# Patient Record
Sex: FEMALE
[De-identification: names, ages, dates, MRNs, and addresses within clinical notes are randomized per-mention and may not be internally consistent; named-entity substitution may affect disease eponyms.]

---

## 2017-09-11 ENCOUNTER — HOSPITAL ENCOUNTER (EMERGENCY)
Dept: HOSPITAL 14 - H.ER | Age: 37
Discharge: HOME | End: 2017-09-11
Payer: COMMERCIAL

## 2017-09-11 VITALS — SYSTOLIC BLOOD PRESSURE: 138 MMHG | TEMPERATURE: 98.2 F | DIASTOLIC BLOOD PRESSURE: 84 MMHG | HEART RATE: 88 BPM

## 2017-09-11 VITALS — OXYGEN SATURATION: 98 % | RESPIRATION RATE: 17 BRPM

## 2017-09-11 VITALS — BODY MASS INDEX: 31.4 KG/M2

## 2017-09-11 DIAGNOSIS — K21.9: ICD-10-CM

## 2017-09-11 DIAGNOSIS — Z99.3: ICD-10-CM

## 2017-09-11 DIAGNOSIS — N39.0: Primary | ICD-10-CM

## 2017-09-11 NOTE — ED PDOC
HPI: Female  Pain


Time Seen by Provider: 09/11/17 21:09


Chief Complaint (Nursing): Female Genitourinary


Chief Complaint (Provider): Dysuria


History Per: Patient


History/Exam Limitations: no limitations


Onset/Duration Of Symptoms: Days (x 2-3)


Current Symptoms Are (Timing): Still Present


Additional Complaint(s): 





Yenifer Spence is a 36 y/o female who presents to the ED complaining of 

painful urination and urinary frequency for the past 2-3 days. Symptoms are 

described as burning during urination. Denies abdominal pain, back pain, fevers 

and chills. Patient has a history of UTIs but not frequently. 





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 

bound. 





PMD: Dr. Brianne Leiva





Past Medical History


Reviewed: Historical Data, Nursing Documentation, Vital Signs


Vital Signs: 





 Last Vital Signs











Temp  98.3 F   09/11/17 21:05


 


Pulse  93 H  09/11/17 21:05


 


Resp  17   09/11/17 21:05


 


BP  157/93 H  09/11/17 21:05


 


Pulse Ox  98   09/11/17 21:05














- Medical History


PMH: Arthritis, Asthma, Gastritis, GERD


   Denies: Diabetes, HTN, Chronic Kidney Disease


Other PMH: arthrogryposis, wheelchair dependent





- Surgical History


Surgical History: Endoscopy


Other surgeries: Multiple reconstructive surgeries





- Family History


Family History: States: Unknown Family Hx





- Home Medications


Home Medications: 


 Ambulatory Orders











 Medication  Instructions  Recorded


 


Omeprazole [Prilosec] 20 mg PO BID 10/04/15


 


Ibuprofen [Motrin] 600 mg PO Q6 PRN #20 tab 12/16/15


 


Albuterol HFA [Ventolin HFA 90 1 puff IH PRN PRN 12/16/16





mcg/actuation (8 g)]  


 


Fluticasone/Salmeterol 500/50 500 mg IH BID 12/16/16





[Advair Diskus 500/50]  


 


Nitrofurantoin Macrocrystals 100 mg PO BID #14 cap 09/11/17





[Macrobid]  














- Allergies


Allergies/Adverse Reactions: 


 Allergies











Allergy/AdvReac Type Severity Reaction Status Date / Time


 


No Known Allergies Allergy   Verified 09/11/17 21:08














Review of Systems


ROS Statement: Except As Marked, All Systems Reviewed And Found Negative


Constitutional: Negative for: Fever, Chills


Gastrointestinal: Negative for: Abdominal Pain


Genitourinary Female: Positive for: Dysuria, Frequency


Musculoskeletal: Negative for: Back Pain





Physical Exam





- Reviewed


Nursing Documentation Reviewed: Yes


Vital Signs Reviewed: Yes





- Physical Exam


Appears: Positive for: Well (seated in wheelchair during exam), Non-toxic, No 

Acute Distress


Head Exam: Positive for: ATRAUMATIC, NORMAL INSPECTION, NORMOCEPHALIC


Skin: Positive for: Normal Color, Warm, Dry


Eye Exam: Positive for: EOMI, Normal appearance, PERRL


Neck: Positive for: Normal, Painless ROM, Supple


Respiratory: Negative for: Respiratory Distress


Gastrointestinal/Abdominal: Positive for: Normal Exam, Soft.  Negative for: 

Tenderness


Extremity: Positive for: Other (Limited rom in legs).  Negative for: Normal ROM


Neurologic/Psych: Positive for: Alert, Oriented





- ECG


O2 Sat by Pulse Oximetry: 98 (RA)


Pulse Ox Interpretation: Normal





Medical Decision Making


Medical Decision Making: 





Initial Impression: UTI





Time: 21:20


Initial Plan:


--Pregnancy test


--ED Urine dipstick  





Time: 22:04


Clinical Impression: UTI





Upon provider evaluation patient is medically stable, and requires no further 

treatment in the ED at this time. Patient will be discharged with Rx for 

Macrobid. Counseling was provided and all questions were answered regarding 

diagnosis and need for follow up with PMD. There is agreement to discharge 

plan. Return if symptoms persist or worsen.





--------------------------------------------------------------------------------

-----------------


Scribe Attestation:


Documented by Julieta Mcallister, acting as a scribe for Sienna Álvarez MD





Provider Scribe Attestation:


All medical record entries made by the Scribe were at my direction and 

personally dictated by me. I have reviewed the chart and agree that the record 

accurately reflects my personal performance of the history, physical exam, 

medical decision making, and the department course for this patient. I have 

also personally directed, reviewed, and agree with the discharge instructions 

and disposition.





Disposition





- Clinical Impression


Clinical Impression: 


 UTI (urinary tract infection)








- Patient ED Disposition


Is Patient to be Admitted: No


Counseled Patient/Family Regarding: Studies Performed, Diagnosis, Need For 

Followup, Rx Given





- Disposition


Referrals: 


Brianne Leiva APN [Advanced Practice Nurse] - 


Disposition: Routine/Home


Disposition Time: 22:04


Condition: GOOD


Additional Instructions: 


Please follow up with PMD in 1-2 days. Take medication as instructed. Return if 

symptoms persist or worsen. 


Prescriptions: 


Nitrofurantoin Macrocrystals [Macrobid] 100 mg PO BID #14 cap


Instructions:  Urinary Tract Infection in Women (DC)

## 2018-01-18 ENCOUNTER — HOSPITAL ENCOUNTER (EMERGENCY)
Dept: HOSPITAL 14 - H.ER | Age: 38
Discharge: HOME | End: 2018-01-18
Payer: COMMERCIAL

## 2018-01-18 VITALS — RESPIRATION RATE: 18 BRPM

## 2018-01-18 VITALS — BODY MASS INDEX: 29.8 KG/M2

## 2018-01-18 VITALS
DIASTOLIC BLOOD PRESSURE: 97 MMHG | HEART RATE: 98 BPM | OXYGEN SATURATION: 97 % | SYSTOLIC BLOOD PRESSURE: 142 MMHG | TEMPERATURE: 97 F

## 2018-01-18 DIAGNOSIS — K21.9: ICD-10-CM

## 2018-01-18 DIAGNOSIS — N39.0: Primary | ICD-10-CM

## 2018-01-18 DIAGNOSIS — J45.909: ICD-10-CM

## 2018-01-18 NOTE — ED PDOC
HPI: Female  Pain


Time Seen by Provider: 01/18/18 09:18


Chief Complaint (Nursing): Female Genitourinary


Chief Complaint (Provider): Female Genitourinary


History Per: Patient


History/Exam Limitations: no limitations


Onset/Duration Of Symptoms: Days (x 1)


Current Symptoms Are (Timing): Still Present


Additional Complaint(s): 


37 year old female with a history of multiple urinary tract infections presents 

to the ED complaining of dysuria since last night. Also complains of lower 

abdominal cramping and urinary urgency. Patient reports feeling very similar 

symptoms to her previous UTIs. Denies incontinence, hematuria, vaginal 

discharge and fever. 








PMD: Dr. Brianne Leiva MD





Past Medical History


Reviewed: Historical Data, Nursing Documentation, Vital Signs


Vital Signs: 





 Last Vital Signs











Temp  97 F L  01/18/18 08:34


 


Pulse  98 H  01/18/18 08:34


 


Resp  18   01/18/18 08:34


 


BP  142/97 H  01/18/18 08:34


 


Pulse Ox  97   01/18/18 08:34














- Medical History


PMH: Arthritis, Asthma, Gastritis, GERD


   Denies: Diabetes, HTN, Chronic Kidney Disease


Other PMH: Urinary tract infections (multiple)





- Surgical History


Surgical History: Endoscopy





- Family History


Family History: States: Unknown Family Hx





- Social History


Current smoker - smoking cessation education provided: No


Alcohol: None


Drugs: Denies





- Home Medications


Home Medications: 


 Ambulatory Orders











 Medication  Instructions  Recorded


 


Omeprazole [Prilosec] 20 mg PO BID 10/04/15


 


Ibuprofen [Motrin] 600 mg PO Q6 PRN #20 tab 12/16/15


 


Albuterol HFA [Ventolin HFA 90 1 puff IH PRN PRN 12/16/16





mcg/actuation (8 g)]  


 


Fluticasone/Salmeterol 500/50 500 mg IH BID 12/16/16





[Advair Diskus 500/50]  


 


Nitrofurantoin Macrocrystals 100 mg PO BID #14 cap 09/11/17





[Macrobid]  


 


Naproxen [Naprosyn] 500 mg PO Q12 PRN #20 tablet 11/07/17


 


Nitrofurantoin Macrocrystals 100 mg PO BID #13 cap 01/18/18





[Macrobid]  


 


Phenazopyridine [Pyridium] 200 mg PO TID PRN #6 tab 01/18/18














- Allergies


Allergies/Adverse Reactions: 


 Allergies











Allergy/AdvReac Type Severity Reaction Status Date / Time


 


No Known Allergies Allergy   Verified 01/18/18 08:34














Review of Systems


ROS Statement: Except As Marked, All Systems Reviewed And Found Negative


Constitutional: Negative for: Fever


Gastrointestinal: Positive for: Abdominal Pain (lower abdominal cramping)


Genitourinary Female: Positive for: Dysuria, Frequency.  Negative for: 

Incontinence, Hematuria, Vaginal Discharge





Physical Exam





- Reviewed


Nursing Documentation Reviewed: Yes


Vital Signs Reviewed: Yes





- Physical Exam


Appears: Positive for: Non-toxic, No Acute Distress


Head Exam: Positive for: ATRAUMATIC, NORMOCEPHALIC


Skin: Positive for: Normal Color, Warm, Dry


Eye Exam: Positive for: EOMI, Normal appearance, PERRL


Neck: Positive for: Normal, Painless ROM, Supple


Cardiovascular/Chest: Positive for: Regular Rate, Rhythm


Respiratory: Positive for: Normal Breath Sounds.  Negative for: Respiratory 

Distress


Gastrointestinal/Abdominal: Positive for: Normal Exam, Soft.  Negative for: 

Tenderness


Back: Positive for: Normal Inspection


Extremity: Positive for: Normal ROM.  Negative for: Deformity


Neurologic/Psych: Positive for: Alert, Oriented (x 3)





- ECG


O2 Sat by Pulse Oximetry: 97 (RA)


Pulse Ox Interpretation: Normal





Medical Decision Making


Medical Decision Making: 


Time: 09:23


Impression: 


Plan: 


--urine dipstick


--Urine pregnancy 


--Macrobid 100 mg PO 


--Urine cx








Urine dip was negative for nitrates. Discussed with patient that we are putting 

her on antibiotics for UTI with her history of them. If urine culture is 

negative, patient is encouraged to follow up with PMD to figure out another 

possible source of her dysuria. 











--------------------------------------------------------------------------------

-----------------





Scribe Attestation: 


Documented by Pina Cook, acting as a scribe for Stephanie Mari MD 





Provider Scribe Attestation:


All medical record entries made by the Scribe were at my direction and 

personally dictated by me. I have reviewed the chart and agree that the record 

accurately reflects my personal performance of the history, physical exam, 

medical decision making, and the department course for this patient. I have 

also personally directed, reviewed, and agree with the discharge instructions 

and disposition.








Disposition





- Clinical Impression


Clinical Impression: 


 UTI (urinary tract infection)








- Disposition


Disposition: Routine/Home


Disposition Time: 09:24


Condition: STABLE


Additional Instructions: 


FOLLOW-UP WITH PMD WITHIN 2 DAYS FOR REEVALUATION. 


Prescriptions: 


Nitrofurantoin Macrocrystals [Macrobid] 100 mg PO BID #13 cap


Phenazopyridine [Pyridium] 200 mg PO TID PRN #6 tab


 PRN Reason: Bladder Spasm


Instructions:  Urinary Tract Infection in Women (ED)


Forms:  [facility] (English)

## 2018-05-21 ENCOUNTER — HOSPITAL ENCOUNTER (OUTPATIENT)
Dept: HOSPITAL 31 - C.ENDO | Age: 38
Discharge: HOME | End: 2018-05-21
Attending: INTERNAL MEDICINE
Payer: COMMERCIAL

## 2018-05-21 VITALS
HEART RATE: 88 BPM | RESPIRATION RATE: 18 BRPM | OXYGEN SATURATION: 100 % | SYSTOLIC BLOOD PRESSURE: 140 MMHG | DIASTOLIC BLOOD PRESSURE: 85 MMHG

## 2018-05-21 VITALS — BODY MASS INDEX: 29.8 KG/M2

## 2018-05-21 VITALS — TEMPERATURE: 97 F

## 2018-05-21 DIAGNOSIS — K64.8: ICD-10-CM

## 2018-05-21 DIAGNOSIS — C20: Primary | ICD-10-CM

## 2018-05-21 LAB
ALBUMIN SERPL-MCNC: 4.2 G/DL (ref 3.5–5)
ALBUMIN/GLOB SERPL: 1.1 {RATIO} (ref 1–2.1)
ALT SERPL-CCNC: 26 U/L (ref 9–52)
AST SERPL-CCNC: 23 U/L (ref 14–36)
BASOPHILS # BLD AUTO: 0 K/UL (ref 0–0.2)
BASOPHILS NFR BLD: 0.2 % (ref 0–2)
BUN SERPL-MCNC: 6 MG/DL (ref 7–17)
CALCIUM SERPL-MCNC: 9.2 MG/DL (ref 8.6–10.4)
EOSINOPHIL # BLD AUTO: 0 K/UL (ref 0–0.7)
EOSINOPHIL NFR BLD: 0.5 % (ref 0–4)
ERYTHROCYTE [DISTWIDTH] IN BLOOD BY AUTOMATED COUNT: 12.8 % (ref 11.5–14.5)
GFR NON-AFRICAN AMERICAN: > 60
HGB BLD-MCNC: 14 G/DL (ref 11–16)
LYMPHOCYTES # BLD AUTO: 2.6 K/UL (ref 1–4.3)
LYMPHOCYTES NFR BLD AUTO: 28.5 % (ref 20–40)
MCH RBC QN AUTO: 30.7 PG (ref 27–31)
MCHC RBC AUTO-ENTMCNC: 35 G/DL (ref 33–37)
MCV RBC AUTO: 87.8 FL (ref 81–99)
MONOCYTES # BLD: 0.6 K/UL (ref 0–0.8)
MONOCYTES NFR BLD: 6.1 % (ref 0–10)
NEUTROPHILS # BLD: 5.9 K/UL (ref 1.8–7)
NEUTROPHILS NFR BLD AUTO: 64.7 % (ref 50–75)
NRBC BLD AUTO-RTO: 0.1 % (ref 0–2)
PLATELET # BLD: 318 K/UL (ref 130–400)
PMV BLD AUTO: 7.8 FL (ref 7.2–11.7)
RBC # BLD AUTO: 4.58 MIL/UL (ref 3.8–5.2)
WBC # BLD AUTO: 9.1 K/UL (ref 4.8–10.8)

## 2018-05-21 PROCEDURE — 80053 COMPREHEN METABOLIC PANEL: CPT

## 2018-05-21 PROCEDURE — 45380 COLONOSCOPY AND BIOPSY: CPT

## 2018-05-21 PROCEDURE — 84703 CHORIONIC GONADOTROPIN ASSAY: CPT

## 2018-05-21 PROCEDURE — 82378 CARCINOEMBRYONIC ANTIGEN: CPT

## 2018-05-21 PROCEDURE — 88305 TISSUE EXAM BY PATHOLOGIST: CPT

## 2018-05-21 PROCEDURE — 36415 COLL VENOUS BLD VENIPUNCTURE: CPT

## 2018-05-21 PROCEDURE — 85025 COMPLETE CBC W/AUTO DIFF WBC: CPT

## 2018-05-30 ENCOUNTER — HOSPITAL ENCOUNTER (OUTPATIENT)
Dept: HOSPITAL 31 - C.ENDO | Age: 38
Discharge: HOME | End: 2018-05-30
Attending: INTERNAL MEDICINE
Payer: COMMERCIAL

## 2018-05-30 VITALS — RESPIRATION RATE: 20 BRPM | HEART RATE: 103 BPM | SYSTOLIC BLOOD PRESSURE: 128 MMHG | DIASTOLIC BLOOD PRESSURE: 82 MMHG

## 2018-05-30 VITALS — BODY MASS INDEX: 29.8 KG/M2

## 2018-05-30 VITALS — TEMPERATURE: 98 F

## 2018-05-30 VITALS — OXYGEN SATURATION: 100 %

## 2018-05-30 DIAGNOSIS — C20: Primary | ICD-10-CM

## 2018-05-30 DIAGNOSIS — K21.0: ICD-10-CM

## 2018-05-30 DIAGNOSIS — K44.9: ICD-10-CM

## 2018-05-30 DIAGNOSIS — K59.09: ICD-10-CM

## 2018-05-30 PROCEDURE — 45335 SIGMOIDOSCOPY W/SUBMUC INJ: CPT

## 2018-05-30 PROCEDURE — 45342 SIGMOIDOSCOPY W/US GUIDE BX: CPT

## 2018-05-30 PROCEDURE — 88305 TISSUE EXAM BY PATHOLOGIST: CPT

## 2018-05-30 PROCEDURE — 84703 CHORIONIC GONADOTROPIN ASSAY: CPT

## 2018-05-30 PROCEDURE — 45331 SIGMOIDOSCOPY AND BIOPSY: CPT

## 2018-07-29 ENCOUNTER — HOSPITAL ENCOUNTER (EMERGENCY)
Dept: HOSPITAL 14 - H.ER | Age: 38
Discharge: HOME | End: 2018-07-29
Payer: COMMERCIAL

## 2018-07-29 VITALS — BODY MASS INDEX: 30.7 KG/M2

## 2018-07-29 VITALS — TEMPERATURE: 98.9 F | OXYGEN SATURATION: 98 % | RESPIRATION RATE: 16 BRPM

## 2018-07-29 VITALS — HEART RATE: 94 BPM | SYSTOLIC BLOOD PRESSURE: 128 MMHG | DIASTOLIC BLOOD PRESSURE: 80 MMHG

## 2018-07-29 DIAGNOSIS — C19: ICD-10-CM

## 2018-07-29 DIAGNOSIS — N39.0: Primary | ICD-10-CM

## 2018-07-29 LAB
BILIRUB UR-MCNC: NEGATIVE MG/DL
COLOR UR: YELLOW
GLUCOSE UR STRIP-MCNC: (no result) MG/DL
LEUKOCYTE ESTERASE UR-ACNC: (no result) LEU/UL
PH UR STRIP: 6 [PH] (ref 5–8)
PROT UR STRIP-MCNC: NEGATIVE MG/DL
RBC # UR STRIP: NEGATIVE /UL
SP GR UR STRIP: 1 (ref 1–1.03)
SQUAMOUS EPITHIAL: < 1 /HPF (ref 0–5)
URINE CLARITY: (no result)
UROBILINOGEN UR-MCNC: (no result) MG/DL (ref 0.2–1)

## 2018-07-29 NOTE — ED PDOC
HPI: Female  Pain


Time Seen by Provider: 07/29/18 10:14


Chief Complaint (Nursing): Female Genitourinary


History Per: Patient


Additional Complaint(s): 





Pt is a 38 yo female, currently going through treatment for colorectal CA, 

presents to ED for evaluation of possible UTI. Pt notes mild burning at the end 

or urinating.  Pt was told that her bladder can spasm due to treatments and 

that will be normal. Pt denies fever, chills, nausea, vomiting, abdominal or 

back pain. 





Past Medical History


Reviewed: Nursing Documentation, Vital Signs


Vital Signs: 





 Last Vital Signs











Temp  98.9 F   07/29/18 10:09


 


Pulse  110 H  07/29/18 10:09


 


Resp  16   07/29/18 10:09


 


BP  133/81   07/29/18 10:09


 


Pulse Ox  98   07/29/18 10:09














- Medical History


PMH: Arthritis, Asthma (TAKES NO MEDS, OR INHALERS), Gastritis, GERD


   Denies: Diabetes, Fractures, HTN, Chronic Kidney Disease





- Surgical History


Surgical History: Endoscopy





- Family History


Family History: States: Unknown Family Hx





- Living Arrangements


Living Arrangements: With Family





- Social History


Current smoker - smoking cessation education provided: No


Alcohol: None


Drugs: Denies





- Home Medications


Home Medications: 


 Ambulatory Orders











 Medication  Instructions  Recorded


 


Omeprazole Magnesium [Prilosec Otc] 1 tab PO QOD6 05/21/18


 


Multivitamin [Multivitamins] 1 tab PO DAILY 05/30/18


 


Ranitidine HCl 150 mg PO QOD6 05/30/18


 


Nitrofurantoin Macrocrystals 100 mg PO BID #10 cap 07/29/18





[Macrobid]  


 


Phenazopyridine HCl [Pyridium] 100 mg PO TID #6 tab 07/29/18














- Allergies


Allergies/Adverse Reactions: 


 Allergies











Allergy/AdvReac Type Severity Reaction Status Date / Time


 


No Known Allergies Allergy   Verified 07/29/18 10:18














Review of Systems


ROS Statement: Except As Marked, All Systems Reviewed And Found Negative


Genitourinary Female: Positive for: Dysuria





Physical Exam





- Reviewed


Nursing Documentation Reviewed: Yes


Vital Signs Reviewed: Yes





- Physical Exam


Appears: Positive for: Well, Non-toxic, No Acute Distress


Head Exam: Positive for: ATRAUMATIC, NORMAL INSPECTION, NORMOCEPHALIC


Skin: Positive for: Normal Color, Warm, DRY


Eye Exam: Positive for: EOMI, Normal appearance, PERRL


ENT: Positive for: Normal ENT Inspection


Neck: Positive for: Normal, Painless ROM


Cardiovascular/Chest: Positive for: Regular Rate, Rhythm


Respiratory: Positive for: CNT, Normal Breath Sounds


Gastrointestinal/Abdominal: Positive for: Normal Exam, Soft


Back: Positive for: Normal Inspection


Extremity: Positive for: Normal ROM


Neurologic/Psych: Positive for: Alert





- ECG


O2 Sat by Pulse Oximetry: 98





Medical Decision Making


Medical Decision Making: 





UA reviewed. Pt given RX for Macrobid, CX sent. 





advised to follow up with PCP as scheduled tomorrow





Disposition





- Clinical Impression


Clinical Impression: 


 UTI (urinary tract infection)








- Patient ED Disposition


Is Patient to be Admitted: No





- Disposition


Disposition: Routine/Home


Disposition Time: 12:11


Condition: STABLE


Prescriptions: 


Nitrofurantoin Macrocrystals [Macrobid] 100 mg PO BID #10 cap


Phenazopyridine HCl [Pyridium] 100 mg PO TID #6 tab


Instructions:  Urinary Tract Infections in Adults


Forms:  [facility] (English)

## 2018-10-31 ENCOUNTER — HOSPITAL ENCOUNTER (EMERGENCY)
Dept: HOSPITAL 14 - H.ER | Age: 38
LOS: 1 days | Discharge: HOME | End: 2018-11-01
Payer: COMMERCIAL

## 2018-10-31 VITALS — RESPIRATION RATE: 18 BRPM | SYSTOLIC BLOOD PRESSURE: 113 MMHG | DIASTOLIC BLOOD PRESSURE: 64 MMHG

## 2018-10-31 VITALS — BODY MASS INDEX: 30.7 KG/M2

## 2018-10-31 DIAGNOSIS — Z93.2: Primary | ICD-10-CM

## 2018-10-31 LAB
ALBUMIN SERPL-MCNC: 4.7 G/DL (ref 3.5–5)
ALBUMIN/GLOB SERPL: 1.1 {RATIO} (ref 1–2.1)
ALT SERPL-CCNC: 38 U/L (ref 9–52)
AST SERPL-CCNC: 25 U/L (ref 14–36)
BASE EXCESS BLDV CALC-SCNC: -3.2 MMOL/L (ref 0–2)
BASOPHILS # BLD AUTO: 0 K/UL (ref 0–0.2)
BASOPHILS NFR BLD: 0.4 % (ref 0–2)
BUN SERPL-MCNC: 14 MG/DL (ref 7–17)
CALCIUM SERPL-MCNC: 10 MG/DL (ref 8.4–10.2)
EOSINOPHIL # BLD AUTO: 0.1 K/UL (ref 0–0.7)
EOSINOPHIL NFR BLD: 1.5 % (ref 0–4)
ERYTHROCYTE [DISTWIDTH] IN BLOOD BY AUTOMATED COUNT: 12.3 % (ref 11.5–14.5)
GFR NON-AFRICAN AMERICAN: > 60
HGB BLD-MCNC: 14.5 G/DL (ref 12–16)
LYMPHOCYTES # BLD AUTO: 1.1 K/UL (ref 1–4.3)
LYMPHOCYTES NFR BLD AUTO: 15.6 % (ref 20–40)
MCH RBC QN AUTO: 31 PG (ref 27–31)
MCHC RBC AUTO-ENTMCNC: 34.6 G/DL (ref 33–37)
MCV RBC AUTO: 89.5 FL (ref 81–99)
MONOCYTES # BLD: 0.7 K/UL (ref 0–0.8)
MONOCYTES NFR BLD: 9.8 % (ref 0–10)
NEUTROPHILS # BLD: 5 K/UL (ref 1.8–7)
NEUTROPHILS NFR BLD AUTO: 72.7 % (ref 50–75)
NRBC BLD AUTO-RTO: 0.2 % (ref 0–0)
PCO2 BLDV: 43 MMHG (ref 40–60)
PH BLDV: 7.33 [PH] (ref 7.32–7.43)
PLATELET # BLD: 368 K/UL (ref 130–400)
PMV BLD AUTO: 7.6 FL (ref 7.2–11.7)
RBC # BLD AUTO: 4.69 MIL/UL (ref 3.8–5.2)
VENOUS BLOOD FIO2: 21 %
VENOUS BLOOD GAS PO2: 42 MM/HG (ref 30–55)
WBC # BLD AUTO: 6.9 K/UL (ref 4.8–10.8)

## 2018-10-31 PROCEDURE — 80053 COMPREHEN METABOLIC PANEL: CPT

## 2018-10-31 PROCEDURE — 96374 THER/PROPH/DIAG INJ IV PUSH: CPT

## 2018-10-31 PROCEDURE — 87040 BLOOD CULTURE FOR BACTERIA: CPT

## 2018-10-31 PROCEDURE — 85025 COMPLETE CBC W/AUTO DIFF WBC: CPT

## 2018-10-31 PROCEDURE — 82803 BLOOD GASES ANY COMBINATION: CPT

## 2018-10-31 PROCEDURE — 99284 EMERGENCY DEPT VISIT MOD MDM: CPT

## 2018-10-31 NOTE — CP.PCM.CON
History of Present Illness





- History of Present Illness


History of Present Illness: 


GENERAL SURGERY CONSULT NOTE FOR DR. ZHOU





39yo F with PMHx of arthrogriposis and rectal cancer s/p LAR w/ ileostomy 

presents to the ED with complains of ileostomy pain and leaking. She had an LAR 

with ileostomy for rectal cancer on October 5, 2018 by Dr. Kimble at [facility]. She was discharged on the 9th. Since then, she has had problems with 

the ileostomy leaking. She has seen Dr. Kimble in his office 3 times since the 

surgery. She saw Dr. Kimble today and the bridge was removed. Her ileostomy was 

changed by the wound care nurse. However, after being home only an hour, the bag

was already leaking. 


The patient and her family state that they are changing the bag every hour due 

to leakage. They have seen wound care and tried numerous things including stoma 

paste, skin prep, malox. The patient states that she is not eating much because 

it hurts too much when she had output into the ostomy. 


Patient denies abdominal pain, fever, chills, nausea or vomiting. She is having 

small mucus drainage from the rectum.





PMHx: arthrogriposis, arthritis, rectal cancer


 


Surgeries: LAR w/ ileostomy on Oct 5, 2018 by Dr. Gilson Kimble at [facility]. Plan is for chemo and then Dr. Kimble will reverse ileostomy after 

chemo.





Allergies: none











Review of Systems





- Review of Systems


All systems: reviewed and no additional remarkable complaints except (as per 

HPI)





Past Patient History





- Infectious Disease


Hx of Infectious Diseases: None





- Past Medical History & Family History


Past Medical History?: Yes





- Past Social History


Smoking Status: Never Smoked





- CARDIAC


Hx Hypertension: No





- PULMONARY


Hx Asthma: Yes (TAKES NO MEDS, OR INHALERS)





- NEUROLOGICAL


Hx Neurological Disorder: No





- HEENT


Hx HEENT Problems: No





- RENAL


Hx Chronic Kidney Disease: No





- ENDOCRINE/METABOLIC


Hx Endocrine Disorders: No





- HEMATOLOGICAL/ONCOLOGICAL


Hx Blood Transfusions: No





- INTEGUMENTARY


Hx Dermatological Problems: No





- MUSCULOSKELETAL/RHEUMATOLOGICAL


Hx Arthritis: Yes


Hx Fractures: No





- GASTROINTESTINAL


Hx Gastritis: Yes





- GENITOURINARY/GYNECOLOGICAL


Hx Genitourinary Disorders: Yes


Hx Urinary Tract Infection: Yes





- PSYCHIATRIC


Hx Psychophysiologic Disorder: No


Hx Substance Use: No





- SURGICAL HISTORY


Other/Comment: ileostomy 10/5/18





- ANESTHESIA


Hx Anesthesia: Yes


Hx Anesthesia Reactions: No


Hx Malignant Hyperthermia: No





Meds


Allergies/Adverse Reactions: 


                                    Allergies











Allergy/AdvReac Type Severity Reaction Status Date / Time


 


No Known Allergies Allergy   Verified 10/31/18 18:15














Physical Exam





- Constitutional


Appears: Non-toxic, No Acute Distress





- Head Exam


Head Exam: NORMAL INSPECTION





- Eye Exam


Eye Exam: EOMI, Normal appearance





- Respiratory Exam


Respiratory Exam: NORMAL BREATHING PATTERN.  absent: Respiratory Distress





- Cardiovascular Exam


Cardiovascular Exam: +S1, +S2





- GI/Abdominal Exam


GI & Abdominal Exam: Soft.  absent: Distended, Firm, Guarding, Rebound, Rigid, 

Tenderness


Additional comments: 


Excoriated skin around ileostomy with liquid stool output. Skin very tender. 

Stoma pink and patent





- Neurological Exam


Neurological exam: Alert, CN II-XII Intact, Oriented x3





- Psychiatric Exam


Psychiatric exam: Normal Affect, Normal Mood





- Skin


Skin Exam: Warm





Results





- Vital Signs


Recent Vital Signs: 


                                Last Vital Signs











Temp  99.1 F   10/31/18 18:15


 


Pulse  112 H  10/31/18 18:15


 


Resp  20   10/31/18 18:15


 


BP  113/77   10/31/18 18:15


 


Pulse Ox  98   10/31/18 22:41














- Labs


Result Diagrams: 


                                 10/31/18 20:15





                                 10/31/18 20:15


Labs: 


                         Laboratory Results - last 24 hr











  10/31/18 10/31/18 10/31/18





  20:15 20:15 21:26


 


WBC  6.9  


 


RBC  4.69  


 


Hgb  14.5  


 


Hct  41.9  


 


MCV  89.5  D  


 


MCH  31.0  


 


MCHC  34.6  


 


RDW  12.3  


 


Plt Count  368  


 


MPV  7.6  


 


Neut % (Auto)  72.7  


 


Lymph % (Auto)  15.6 L  


 


Mono % (Auto)  9.8  


 


Eos % (Auto)  1.5  


 


Baso % (Auto)  0.4  


 


Neut # (Auto)  5.0  


 


Lymph # (Auto)  1.1  


 


Mono # (Auto)  0.7  


 


Eos # (Auto)  0.1  


 


Baso # (Auto)  0.0  


 


pO2    42


 


VBG pH    7.33


 


VBG pCO2    43


 


VBG HCO3    21.8


 


VBG Total CO2    24.0


 


VBG O2 Sat (Calc)    78.2 H


 


VBG Base Excess    -3.2 L


 


VBG Potassium    3.4 L


 


Glucose    105


 


Lactate    1.1


 


FiO2    21.0


 


Sodium   141  138.0


 


Potassium   3.9 


 


Chloride   106  108.0 H


 


Carbon Dioxide   21 L 


 


Anion Gap   18 


 


BUN   14 


 


Creatinine   0.3 L 


 


Est GFR ( Amer)   > 60 


 


Est GFR (Non-Af Amer)   > 60 


 


Random Glucose   104 


 


Calcium   10.0 


 


Total Bilirubin   0.4 


 


AST   25 


 


ALT   38 


 


Alkaline Phosphatase   106 


 


Total Protein   8.9 H 


 


Albumin   4.7 


 


Globulin   4.2 H 


 


Albumin/Globulin Ratio   1.1 


 


Venous Blood Potassium    3.4 L














Assessment & Plan





- Assessment and Plan (Free Text)


Assessment: 


39yo F with PMHx of arthrogriposis, rectal cancer s/p LAR w/ ileostomy presents 

to the ED with complains of ileostomy pain and leaking.





- No leukocytosis, electrolytes WNL


- Clear for discharge home to follow up with pt's surgeon, Dr. Kimble and wound 

care nurse


- Recommend Maalox on ileostomy site


- Discussed plan with Dr. Cedrick Cool PGY-4

## 2018-10-31 NOTE — ED PDOC
HPI: Abdomen


Time Seen by Provider: 10/31/18 18:40


Chief Complaint (Nursing): GI Problem


Chief Complaint (Provider): ileostomy bag problems


History Per: Patient


History/Exam Limitations: no limitations


Onset/Duration Of Symptoms: Days


Additional Complaint(s): 





Yenifer Spence is a 38 year old female, with a past medical history of 

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

to the emergency department accompanied by her sister and daughter complaining 

of pain around ileostomy site and leaking around the ileostomy tube. Patient 

states she had surgery done at [facility] on Oct 5th. However, 

she's had problems with her ileostomy since then. Patient states the skin around

the site is very sore and painful. Her ileostomy bag has needed to be changed 

every day to every hour now as opposed to every 2-3 days as its supposed to be. 

Patient reports a decreased appetite for the last x3 days and believes she may 

be dehydrated but denies any fever, chills, or other medical complaints.





PMD: Daija Lopez


Surgeon: At [facility].





Past Medical History


Reviewed: Historical Data, Nursing Documentation, Vital Signs


Vital Signs: 





                                Last Vital Signs











Temp  99.1 F   10/31/18 18:15


 


Pulse  112 H  10/31/18 18:15


 


Resp  20   10/31/18 18:15


 


BP  113/77   10/31/18 18:15


 


Pulse Ox  98   10/31/18 18:15














- Medical History


PMH: Arthritis, Asthma (TAKES NO MEDS, OR INHALERS), Gastritis, GERD


   Denies: Diabetes, Fractures, HTN, Chronic Kidney Disease


Other PMH: rectal CA, congenital muscular disorder





- Surgical History


Surgical History: Endoscopy


Other surgeries: amputated digits





- Family History


Family History: States: Unknown Family Hx





- Social History


Current smoker - smoking cessation education provided: No


Alcohol: None


Drugs: Denies





- Home Medications


Home Medications: 


                                Ambulatory Orders











 Medication  Instructions  Recorded


 


Omeprazole Magnesium [Prilosec Otc] 1 tab PO QOD6 05/21/18


 


RX: Multivitamin [Multivitamins] 1 tab PO DAILY 05/30/18


 


RX: Ranitidine HCl 150 mg PO QOD6 05/30/18


 


Nitrofurantoin Macrocrystals 100 mg PO BID #10 cap 07/29/18





[Macrobid]  


 


Phenazopyridine HCl [Pyridium] 100 mg PO TID #6 tab 07/29/18














- Allergies


Allergies/Adverse Reactions: 


                                    Allergies











Allergy/AdvReac Type Severity Reaction Status Date / Time


 


No Known Allergies Allergy   Verified 10/31/18 18:15














Review of Systems


ROS Statement: Except As Marked, All Systems Reviewed And Found Negative


Constitutional: Negative for: Fever, Chills


Skin: Positive for: Other (pain around ileostomy site with leakage)





Physical Exam





- Reviewed


Nursing Documentation Reviewed: Yes


Vital Signs Reviewed: Yes





- Physical Exam


Appears: Positive for: No Acute Distress


Head Exam: Positive for: ATRAUMATIC, NORMAL INSPECTION, NORMOCEPHALIC


Skin: Positive for: Normal Color, Warm, Dry


Eye Exam: Positive for: Normal appearance, EOMI, PERRL


ENT: Positive for: Other (congenital muscular disorder with facial distortion)


Neck: Positive for: Normal, Painless ROM


Cardiovascular/Chest: Positive for: Regular Rate, Rhythm.  Negative for: Murmur


Respiratory: Positive for: Normal Breath Sounds.  Negative for: Respiratory 

Distress


Gastrointestinal/Abdominal: Positive for: Bowel Sounds, Soft, Other (Right lower

 abdomen with Ileostomy site with light brown stool coming out of stoma. Around 

stoma Skin is denuded and irritated. there is some stool leaking out on to the 

skin which makes it more irritated. ).  Negative for: Tenderness


Back: Positive for: Normal Inspection.  Negative for: L CVA Tenderness, R CVA 

Tenderness, Vertebral Tenderness


Extremity: Positive for: Normal ROM (upper and lower extremities).  Negative 

for: Deformity, Swelling


Neurologic/Psych: Positive for: Alert, Oriented





- Laboratory Results


Result Diagrams: 


                                 10/31/18 20:15





                                 10/31/18 20:15





- ECG


O2 Sat by Pulse Oximetry: 98 (RA)


Pulse Ox Interpretation: Normal





Medical Decision Making


Medical Decision Making: 





Time: 19:18


Initial Impression: ostomy problem r/o electrolyte abnormality and dehydration





Initial Plan:





--CMP


--CBC w/ differential


--Morphine 2 mg IV


--Sodium Chloride 1,000 ml  mls/hr


--Blood culture


--Urine culture


--Urinalysis


--Reevaluation





--Will page surgical resident to come evaluate.








20:05


-Spoke with surgical resident and spoke with Neftaly Andrade (covers [place] 

which is patients primary doctor who requests dr garcia dileep lanza).





22:30


-tried to contact surgeon Dr. Damien Kimble ([phone][facility] 

who is the fellow called back. They are aware of patient and report she was 

discharged on 10/9, seen on 10/18 on the [facility]'s ER and followed up on 

10/24 on Dr. Kimble's office. Patient had excoriated then and they put some Maalox

 on it. The plan of the patient is to finish chemo to reverse the ileostomy. She

 was made aware that pt is in the ER here. the surgical resident, Dr. Umanzor 

spoke with Dr. Garcia, who said pt. is medically stable for discharge as an 

outpatient to Dr. Patrick and he will let Dr. Kimble know tomorrow. Ileostomy bag 

secured in place. pt and family were aware and agreeable to plan, answered 

questions for them .


pt stable for dc home, will follow up





----------------------------------------------

---------------------------------------------------





Scribe Attestation:


Documented by Derek Smith, acting as a scribe for Carlos Sanders MD.





Provider Scribe Attestation:


All medical record entries made by the Scribe were at my direction and 

personally dictated by me. I have reviewed the chart and agree that the record 

accurately reflects my personal performance of the history, physical exam, 

medical decision making, and the department course for this patient. I have also

 personally directed, reviewed, and agree with the discharge instructions and 

disposition.





Disposition





- Clinical Impression


Clinical Impression: 


 Encounter for wound care, Skin irritation








- Patient ED Disposition


Is Patient to be Admitted: No


Counseled Patient/Family Regarding: Studies Performed, Diagnosis, Need For 

Followup





- Disposition


Disposition: Routine/Home


Disposition Time: 22:30


Condition: IMPROVED


Additional Instructions: 


follow up with Dr Kimble in 1-2 days for reevaluation and with your wound care 

nurse for further evaluation


return to the ED with any worsening or concerning symptoms


Instructions:  Wound Care (DC)


Forms:  [employer] (English)

## 2018-11-01 VITALS — HEART RATE: 91 BPM | TEMPERATURE: 98.4 F

## 2018-11-02 VITALS — OXYGEN SATURATION: 98 %
